# Patient Record
Sex: MALE | Race: WHITE | NOT HISPANIC OR LATINO | URBAN - METROPOLITAN AREA
[De-identification: names, ages, dates, MRNs, and addresses within clinical notes are randomized per-mention and may not be internally consistent; named-entity substitution may affect disease eponyms.]

---

## 2023-09-10 DIAGNOSIS — I10 HYPERTENSION, UNSPECIFIED TYPE: Primary | ICD-10-CM

## 2023-09-11 RX ORDER — METOPROLOL SUCCINATE 25 MG/1
25 TABLET, EXTENDED RELEASE ORAL DAILY
Qty: 30 TABLET | Refills: 0 | Status: SHIPPED | OUTPATIENT
Start: 2023-09-11 | End: 2023-10-17

## 2023-09-11 RX ORDER — AMLODIPINE BESYLATE 5 MG/1
5 TABLET ORAL DAILY
Qty: 30 TABLET | Refills: 0 | Status: SHIPPED | OUTPATIENT
Start: 2023-09-11 | End: 2023-10-17

## 2023-10-16 DIAGNOSIS — I10 HYPERTENSION, UNSPECIFIED TYPE: ICD-10-CM

## 2023-10-17 RX ORDER — AMLODIPINE BESYLATE 5 MG/1
TABLET ORAL
Qty: 30 TABLET | Refills: 0 | Status: SHIPPED | OUTPATIENT
Start: 2023-10-17 | End: 2023-11-15

## 2023-10-17 RX ORDER — METOPROLOL SUCCINATE 25 MG/1
TABLET, EXTENDED RELEASE ORAL
Qty: 30 TABLET | Refills: 0 | Status: SHIPPED | OUTPATIENT
Start: 2023-10-17 | End: 2023-11-15

## 2023-10-25 ENCOUNTER — TELEMEDICINE (OUTPATIENT)
Dept: PRIMARY CARE | Facility: CLINIC | Age: 48
End: 2023-10-25
Payer: COMMERCIAL

## 2023-10-25 DIAGNOSIS — B96.89 BACTERIAL SINUSITIS: Primary | ICD-10-CM

## 2023-10-25 DIAGNOSIS — J32.9 BACTERIAL SINUSITIS: Primary | ICD-10-CM

## 2023-10-25 DIAGNOSIS — U07.1 COVID: ICD-10-CM

## 2023-10-25 DIAGNOSIS — Z00.00 HEALTHCARE MAINTENANCE: ICD-10-CM

## 2023-10-25 PROCEDURE — 99213 OFFICE O/P EST LOW 20 MIN: CPT | Performed by: INTERNAL MEDICINE

## 2023-10-25 RX ORDER — AZITHROMYCIN 250 MG/1
TABLET, FILM COATED ORAL
Qty: 6 TABLET | Refills: 2 | Status: SHIPPED | OUTPATIENT
Start: 2023-10-25 | End: 2023-10-30

## 2023-10-25 ASSESSMENT — ENCOUNTER SYMPTOMS
PND: 0
SORE THROAT: 1
NECK PAIN: 0
HYPERTENSION: 1
SHORTNESS OF BREATH: 0
SWEATS: 0
ORTHOPNEA: 0
HEADACHES: 0
BLURRED VISION: 0
PALPITATIONS: 0

## 2023-10-25 NOTE — PROGRESS NOTES
Subjective   Patient ID: Grant Crews is a 48 y.o. male who presents for virtual visit and Sore Throat (Sore throat and congestion x 2 days. Had temperature of 99.0 yesterday.).  Sore Throat   Pertinent negatives include no headaches, neck pain or shortness of breath.   Hypertension  This is a chronic problem. The current episode started more than 1 year ago. The problem is unchanged. The problem is controlled. Pertinent negatives include no anxiety, blurred vision, chest pain, headaches, malaise/fatigue, neck pain, orthopnea, palpitations, peripheral edema, PND, shortness of breath or sweats. There are no associated agents to hypertension. Risk factors for coronary artery disease include family history and obesity. Compliance problems include exercise.                    Health Maintenance:      Colon man with past medical history of essential hypertension and GRACE, covid aug 22  He presented to the ER 10/04/2022 for lightheadedness that started while he was sitting at his desk at work. He was evaluated and results showed negative troponin x2, unremarkable CXR/EKG, and urinalysis and CBC/CMP WNL. He was discharged after symptomatically improving with instructions to hydrate, follow up with PCP, and with a prescription for pantoprazole for acid reflux.     Presents today with sinus congestion for the last 2 days grade 6 out of 10 worse since being diagnosed with COVID when he took a test maybe a little bit better with over-the-counter Coricidin  Odynophagia  Had a temperature yesterday of 99 degrees thought a little feverish  Denies chest pain dyspnea nausea vomiting fever chills otalgia otorrhea    oscopy:      Mammogram:      Pelvic/Pap:      Low dose chest CT:      Aorta duplex:      Optho:      Podiatry:        Vaccines:      Prevnar 20:      Prevnar 13:      Pneumovax 23:      Tdap:      Shingrix:      COVID:      Influenza:        ROS:      General: denies fever/chills/weight loss      Head: Sinus congestion  "denies HA/trauma/masses/dizziness      Eyes: denies vision change/loss of vision/blurry vision/diplopia/eye pain      Ears: denies hearing loss/tinnitus/otalgia/otorrhea      Nose: denies nasal drainage/anosmia      Throat: Odynophagia denies dysphagia/odynophagia      Lymphatics: denies lymph node swelling      Cardiac: denies CP/palpitations/orthopnea/PND      Pulmonary: denies dyspnea/cough/wheezing      GI: denies abd pain/n/v/diarrhea/melena/hematochezia/hematemesis      : denies dysuria/hematuria/change frequency      Genital: denies genital discharge/lesions      Skin: denies rashes/lesions/masses      MSK: denies weakness/swelling/edema/gait imbalance/pain      Neuro: denies paresthesias/seizures/dysarthria      Psych: denies depression/anxiety/suicidal or homicidal ideations            Objective   Ht 1.727 m (5' 8\")   BMI 30.56 kg/m²      Physical Exam:     General: AO3, NAD     Speaking in full sentences nonlabored  Appropriate mood and affect                  No results found for: \"BMPR1A\", \"CBCDIF\"      Assessment/Plan   Diagnoses and all orders for this visit:  Bacterial sinusitis  -     azithromycin (Zithromax) 250 mg tablet; Take 2 tablets (500 mg) by mouth once daily for 1 day, THEN 1 tablet (250 mg) once daily for 4 days. Take 2 tabs (500 mg) by mouth today, than 1 daily for 4 days..  COVID  -     azithromycin (Zithromax) 250 mg tablet; Take 2 tablets (500 mg) by mouth once daily for 1 day, THEN 1 tablet (250 mg) once daily for 4 days. Take 2 tabs (500 mg) by mouth today, than 1 daily for 4 days..  Healthcare maintenance  -     CBC and Auto Differential; Future  -     Comprehensive Metabolic Panel; Future  -     Hemoglobin A1C; Future  -     Lipid Panel; Future  -     Thyroxine, Free; Future  -     Thyroid Stimulating Hormone; Future    Go to the ER for chest pain shortness of breath or other concerning symptoms    Plenty clear fluids rest    Thank you for making appointment today Grant    Please " follow-up 6 months       Malia Lowry MA

## 2023-11-12 DIAGNOSIS — I10 HYPERTENSION, UNSPECIFIED TYPE: ICD-10-CM

## 2023-11-15 ENCOUNTER — LAB (OUTPATIENT)
Dept: LAB | Facility: LAB | Age: 48
End: 2023-11-15
Payer: COMMERCIAL

## 2023-11-15 DIAGNOSIS — Z00.00 HEALTHCARE MAINTENANCE: ICD-10-CM

## 2023-11-15 LAB
ALBUMIN SERPL BCP-MCNC: 4.6 G/DL (ref 3.4–5)
ALP SERPL-CCNC: 58 U/L (ref 33–120)
ALT SERPL W P-5'-P-CCNC: 35 U/L (ref 10–52)
ANION GAP SERPL CALC-SCNC: 11 MMOL/L (ref 10–20)
AST SERPL W P-5'-P-CCNC: 20 U/L (ref 9–39)
BASOPHILS # BLD AUTO: 0.03 X10*3/UL (ref 0–0.1)
BASOPHILS NFR BLD AUTO: 0.4 %
BILIRUB SERPL-MCNC: 0.7 MG/DL (ref 0–1.2)
BUN SERPL-MCNC: 20 MG/DL (ref 6–23)
CALCIUM SERPL-MCNC: 9.5 MG/DL (ref 8.6–10.6)
CHLORIDE SERPL-SCNC: 102 MMOL/L (ref 98–107)
CHOLEST SERPL-MCNC: 228 MG/DL (ref 0–199)
CHOLESTEROL/HDL RATIO: 5.4
CO2 SERPL-SCNC: 32 MMOL/L (ref 21–32)
CREAT SERPL-MCNC: 1.14 MG/DL (ref 0.5–1.3)
EOSINOPHIL # BLD AUTO: 0.14 X10*3/UL (ref 0–0.7)
EOSINOPHIL NFR BLD AUTO: 1.9 %
ERYTHROCYTE [DISTWIDTH] IN BLOOD BY AUTOMATED COUNT: 13.2 % (ref 11.5–14.5)
EST. AVERAGE GLUCOSE BLD GHB EST-MCNC: 111 MG/DL
GFR SERPL CREATININE-BSD FRML MDRD: 80 ML/MIN/1.73M*2
GLUCOSE SERPL-MCNC: 82 MG/DL (ref 74–99)
HBA1C MFR BLD: 5.5 %
HCT VFR BLD AUTO: 44.7 % (ref 41–52)
HDLC SERPL-MCNC: 42.1 MG/DL
HGB BLD-MCNC: 14.8 G/DL (ref 13.5–17.5)
IMM GRANULOCYTES # BLD AUTO: 0.03 X10*3/UL (ref 0–0.7)
IMM GRANULOCYTES NFR BLD AUTO: 0.4 % (ref 0–0.9)
LDLC SERPL CALC-MCNC: 157 MG/DL
LYMPHOCYTES # BLD AUTO: 2.46 X10*3/UL (ref 1.2–4.8)
LYMPHOCYTES NFR BLD AUTO: 34 %
MCH RBC QN AUTO: 29 PG (ref 26–34)
MCHC RBC AUTO-ENTMCNC: 33.1 G/DL (ref 32–36)
MCV RBC AUTO: 88 FL (ref 80–100)
MONOCYTES # BLD AUTO: 0.77 X10*3/UL (ref 0.1–1)
MONOCYTES NFR BLD AUTO: 10.7 %
NEUTROPHILS # BLD AUTO: 3.8 X10*3/UL (ref 1.2–7.7)
NEUTROPHILS NFR BLD AUTO: 52.6 %
NON HDL CHOLESTEROL: 186 MG/DL (ref 0–149)
NRBC BLD-RTO: 0 /100 WBCS (ref 0–0)
PLATELET # BLD AUTO: 286 X10*3/UL (ref 150–450)
POTASSIUM SERPL-SCNC: 4.5 MMOL/L (ref 3.5–5.3)
PROT SERPL-MCNC: 7 G/DL (ref 6.4–8.2)
RBC # BLD AUTO: 5.11 X10*6/UL (ref 4.5–5.9)
SODIUM SERPL-SCNC: 140 MMOL/L (ref 136–145)
T4 FREE SERPL-MCNC: 1.1 NG/DL (ref 0.78–1.48)
TRIGL SERPL-MCNC: 145 MG/DL (ref 0–149)
TSH SERPL-ACNC: 1.81 MIU/L (ref 0.44–3.98)
VLDL: 29 MG/DL (ref 0–40)
WBC # BLD AUTO: 7.2 X10*3/UL (ref 4.4–11.3)

## 2023-11-15 PROCEDURE — 84443 ASSAY THYROID STIM HORMONE: CPT

## 2023-11-15 PROCEDURE — 85025 COMPLETE CBC W/AUTO DIFF WBC: CPT

## 2023-11-15 PROCEDURE — 80061 LIPID PANEL: CPT

## 2023-11-15 PROCEDURE — 36415 COLL VENOUS BLD VENIPUNCTURE: CPT

## 2023-11-15 PROCEDURE — 83036 HEMOGLOBIN GLYCOSYLATED A1C: CPT

## 2023-11-15 PROCEDURE — 84439 ASSAY OF FREE THYROXINE: CPT

## 2023-11-15 PROCEDURE — 80053 COMPREHEN METABOLIC PANEL: CPT

## 2023-11-15 RX ORDER — AMLODIPINE BESYLATE 5 MG/1
TABLET ORAL
Qty: 90 TABLET | Refills: 0 | Status: SHIPPED | OUTPATIENT
Start: 2023-11-15 | End: 2024-02-14

## 2023-11-15 RX ORDER — METOPROLOL SUCCINATE 25 MG/1
TABLET, EXTENDED RELEASE ORAL
Qty: 90 TABLET | Refills: 0 | Status: SHIPPED | OUTPATIENT
Start: 2023-11-15 | End: 2024-02-14

## 2023-11-21 DIAGNOSIS — I10 HYPERTENSION, UNSPECIFIED TYPE: ICD-10-CM

## 2023-11-21 RX ORDER — METOPROLOL SUCCINATE 25 MG/1
TABLET, EXTENDED RELEASE ORAL
Qty: 90 TABLET | Refills: 0 | OUTPATIENT
Start: 2023-11-21

## 2023-11-21 RX ORDER — AMLODIPINE BESYLATE 5 MG/1
TABLET ORAL
Qty: 90 TABLET | Refills: 0 | OUTPATIENT
Start: 2023-11-21

## 2023-11-30 DIAGNOSIS — E78.5 HYPERLIPIDEMIA, UNSPECIFIED HYPERLIPIDEMIA TYPE: Primary | ICD-10-CM

## 2023-11-30 RX ORDER — ATORVASTATIN CALCIUM 10 MG/1
10 TABLET, FILM COATED ORAL DAILY
Qty: 90 TABLET | Refills: 1 | Status: SHIPPED | OUTPATIENT
Start: 2023-11-30 | End: 2024-04-04 | Stop reason: SDUPTHER

## 2024-02-13 DIAGNOSIS — I10 HYPERTENSION, UNSPECIFIED TYPE: ICD-10-CM

## 2024-02-14 RX ORDER — METOPROLOL SUCCINATE 25 MG/1
TABLET, EXTENDED RELEASE ORAL
Qty: 30 TABLET | Refills: 0 | Status: SHIPPED | OUTPATIENT
Start: 2024-02-14 | End: 2024-03-18

## 2024-02-14 RX ORDER — AMLODIPINE BESYLATE 5 MG/1
TABLET ORAL
Qty: 30 TABLET | Refills: 0 | Status: SHIPPED | OUTPATIENT
Start: 2024-02-14 | End: 2024-04-04 | Stop reason: SDUPTHER

## 2024-03-16 DIAGNOSIS — I10 HYPERTENSION, UNSPECIFIED TYPE: ICD-10-CM

## 2024-03-18 RX ORDER — METOPROLOL SUCCINATE 25 MG/1
TABLET, EXTENDED RELEASE ORAL
Qty: 30 TABLET | Refills: 0 | Status: SHIPPED | OUTPATIENT
Start: 2024-03-18 | End: 2024-04-04 | Stop reason: SDUPTHER

## 2024-03-19 ENCOUNTER — TELEPHONE (OUTPATIENT)
Dept: PRIMARY CARE | Facility: CLINIC | Age: 49
End: 2024-03-19
Payer: COMMERCIAL

## 2024-03-19 NOTE — TELEPHONE ENCOUNTER
L/M for patient that he is due for a follow up appointment in the office. There was a 30 day supply of his Metoprolol sent to his local pharmacy.

## 2024-03-27 ENCOUNTER — TELEPHONE (OUTPATIENT)
Dept: PRIMARY CARE | Facility: CLINIC | Age: 49
End: 2024-03-27
Payer: COMMERCIAL

## 2024-03-27 DIAGNOSIS — Z00.00 HEALTHCARE MAINTENANCE: Primary | ICD-10-CM

## 2024-04-02 ENCOUNTER — LAB (OUTPATIENT)
Dept: LAB | Facility: LAB | Age: 49
End: 2024-04-02
Payer: COMMERCIAL

## 2024-04-02 DIAGNOSIS — Z00.00 HEALTHCARE MAINTENANCE: ICD-10-CM

## 2024-04-02 LAB
ALBUMIN SERPL BCP-MCNC: 4.3 G/DL (ref 3.4–5)
ALP SERPL-CCNC: 60 U/L (ref 33–120)
ALT SERPL W P-5'-P-CCNC: 44 U/L (ref 10–52)
ANION GAP SERPL CALC-SCNC: 13 MMOL/L (ref 10–20)
AST SERPL W P-5'-P-CCNC: 25 U/L (ref 9–39)
BASOPHILS # BLD AUTO: 0.03 X10*3/UL (ref 0–0.1)
BASOPHILS NFR BLD AUTO: 0.5 %
BILIRUB SERPL-MCNC: 0.7 MG/DL (ref 0–1.2)
BUN SERPL-MCNC: 17 MG/DL (ref 6–23)
CALCIUM SERPL-MCNC: 9.5 MG/DL (ref 8.6–10.6)
CHLORIDE SERPL-SCNC: 104 MMOL/L (ref 98–107)
CHOLEST SERPL-MCNC: 132 MG/DL (ref 0–199)
CHOLESTEROL/HDL RATIO: 3.7
CO2 SERPL-SCNC: 29 MMOL/L (ref 21–32)
CREAT SERPL-MCNC: 1.28 MG/DL (ref 0.5–1.3)
EGFRCR SERPLBLD CKD-EPI 2021: 69 ML/MIN/1.73M*2
EOSINOPHIL # BLD AUTO: 0.14 X10*3/UL (ref 0–0.7)
EOSINOPHIL NFR BLD AUTO: 2.5 %
ERYTHROCYTE [DISTWIDTH] IN BLOOD BY AUTOMATED COUNT: 12.9 % (ref 11.5–14.5)
EST. AVERAGE GLUCOSE BLD GHB EST-MCNC: 105 MG/DL
GLUCOSE SERPL-MCNC: 114 MG/DL (ref 74–99)
HBA1C MFR BLD: 5.3 %
HCT VFR BLD AUTO: 45 % (ref 41–52)
HDLC SERPL-MCNC: 36.1 MG/DL
HGB BLD-MCNC: 14.8 G/DL (ref 13.5–17.5)
IMM GRANULOCYTES # BLD AUTO: 0.01 X10*3/UL (ref 0–0.7)
IMM GRANULOCYTES NFR BLD AUTO: 0.2 % (ref 0–0.9)
LDLC SERPL CALC-MCNC: 75 MG/DL
LYMPHOCYTES # BLD AUTO: 1.89 X10*3/UL (ref 1.2–4.8)
LYMPHOCYTES NFR BLD AUTO: 33.7 %
MCH RBC QN AUTO: 28.4 PG (ref 26–34)
MCHC RBC AUTO-ENTMCNC: 32.9 G/DL (ref 32–36)
MCV RBC AUTO: 86 FL (ref 80–100)
MONOCYTES # BLD AUTO: 0.67 X10*3/UL (ref 0.1–1)
MONOCYTES NFR BLD AUTO: 11.9 %
NEUTROPHILS # BLD AUTO: 2.87 X10*3/UL (ref 1.2–7.7)
NEUTROPHILS NFR BLD AUTO: 51.2 %
NON HDL CHOLESTEROL: 96 MG/DL (ref 0–149)
NRBC BLD-RTO: 0 /100 WBCS (ref 0–0)
PLATELET # BLD AUTO: 253 X10*3/UL (ref 150–450)
POTASSIUM SERPL-SCNC: 4.5 MMOL/L (ref 3.5–5.3)
PROT SERPL-MCNC: 6.4 G/DL (ref 6.4–8.2)
RBC # BLD AUTO: 5.21 X10*6/UL (ref 4.5–5.9)
SODIUM SERPL-SCNC: 141 MMOL/L (ref 136–145)
T4 FREE SERPL-MCNC: 1.02 NG/DL (ref 0.78–1.48)
TRIGL SERPL-MCNC: 103 MG/DL (ref 0–149)
TSH SERPL-ACNC: 1.48 MIU/L (ref 0.44–3.98)
VLDL: 21 MG/DL (ref 0–40)
WBC # BLD AUTO: 5.6 X10*3/UL (ref 4.4–11.3)

## 2024-04-02 PROCEDURE — 84443 ASSAY THYROID STIM HORMONE: CPT

## 2024-04-02 PROCEDURE — 36415 COLL VENOUS BLD VENIPUNCTURE: CPT

## 2024-04-02 PROCEDURE — 83036 HEMOGLOBIN GLYCOSYLATED A1C: CPT

## 2024-04-02 PROCEDURE — 80053 COMPREHEN METABOLIC PANEL: CPT

## 2024-04-02 PROCEDURE — 84439 ASSAY OF FREE THYROXINE: CPT

## 2024-04-02 PROCEDURE — 85025 COMPLETE CBC W/AUTO DIFF WBC: CPT

## 2024-04-02 PROCEDURE — 80061 LIPID PANEL: CPT

## 2024-04-03 PROBLEM — L30.9 ECZEMA: Status: ACTIVE | Noted: 2024-04-03

## 2024-04-03 PROBLEM — E78.5 HYPERLIPIDEMIA: Status: ACTIVE | Noted: 2024-04-03

## 2024-04-03 PROBLEM — L20.9 ATOPIC DERMATITIS: Status: ACTIVE | Noted: 2024-04-03

## 2024-04-03 PROBLEM — J32.9 BACTERIAL SINUSITIS: Status: RESOLVED | Noted: 2024-04-03 | Resolved: 2024-04-03

## 2024-04-03 PROBLEM — I10 BENIGN ESSENTIAL HYPERTENSION: Status: ACTIVE | Noted: 2022-10-04

## 2024-04-03 PROBLEM — R79.89 HIGH SERUM CREATININE: Status: ACTIVE | Noted: 2024-04-03

## 2024-04-03 PROBLEM — M62.830 PARASPINAL MUSCLE SPASM: Status: RESOLVED | Noted: 2024-04-03 | Resolved: 2024-04-03

## 2024-04-03 PROBLEM — R06.83 SNORING: Status: RESOLVED | Noted: 2024-04-03 | Resolved: 2024-04-03

## 2024-04-03 PROBLEM — R69 DISEASE SUSPECTED: Status: RESOLVED | Noted: 2024-04-03 | Resolved: 2024-04-03

## 2024-04-03 PROBLEM — B96.89 BACTERIAL SINUSITIS: Status: RESOLVED | Noted: 2024-04-03 | Resolved: 2024-04-03

## 2024-04-03 PROBLEM — L71.8 OTHER ROSACEA: Status: RESOLVED | Noted: 2021-10-20 | Resolved: 2024-04-03

## 2024-04-03 PROBLEM — R40.0 DAYTIME SOMNOLENCE: Status: ACTIVE | Noted: 2024-04-03

## 2024-04-03 PROBLEM — U07.1 DISEASE DUE TO SEVERE ACUTE RESPIRATORY SYNDROME CORONAVIRUS 2 (SARS-COV-2): Status: RESOLVED | Noted: 2024-04-03 | Resolved: 2024-04-03

## 2024-04-03 PROBLEM — G47.33 OBSTRUCTIVE SLEEP APNEA SYNDROME: Status: ACTIVE | Noted: 2022-10-04

## 2024-04-04 ENCOUNTER — OFFICE VISIT (OUTPATIENT)
Dept: PRIMARY CARE | Facility: CLINIC | Age: 49
End: 2024-04-04
Payer: COMMERCIAL

## 2024-04-04 VITALS
DIASTOLIC BLOOD PRESSURE: 78 MMHG | HEIGHT: 68 IN | SYSTOLIC BLOOD PRESSURE: 122 MMHG | WEIGHT: 209 LBS | BODY MASS INDEX: 31.67 KG/M2 | OXYGEN SATURATION: 98 % | HEART RATE: 74 BPM

## 2024-04-04 DIAGNOSIS — E78.5 HYPERLIPIDEMIA, UNSPECIFIED HYPERLIPIDEMIA TYPE: ICD-10-CM

## 2024-04-04 DIAGNOSIS — E66.9 OBESITY, UNSPECIFIED CLASSIFICATION, UNSPECIFIED OBESITY TYPE, UNSPECIFIED WHETHER SERIOUS COMORBIDITY PRESENT: ICD-10-CM

## 2024-04-04 DIAGNOSIS — I10 HYPERTENSION, UNSPECIFIED TYPE: ICD-10-CM

## 2024-04-04 DIAGNOSIS — Z00.00 HEALTHCARE MAINTENANCE: Primary | ICD-10-CM

## 2024-04-04 PROCEDURE — 3074F SYST BP LT 130 MM HG: CPT | Performed by: INTERNAL MEDICINE

## 2024-04-04 PROCEDURE — 99213 OFFICE O/P EST LOW 20 MIN: CPT | Performed by: INTERNAL MEDICINE

## 2024-04-04 PROCEDURE — 3078F DIAST BP <80 MM HG: CPT | Performed by: INTERNAL MEDICINE

## 2024-04-04 PROCEDURE — 1036F TOBACCO NON-USER: CPT | Performed by: INTERNAL MEDICINE

## 2024-04-04 RX ORDER — AMLODIPINE BESYLATE 5 MG/1
TABLET ORAL
Qty: 90 TABLET | Refills: 1 | Status: SHIPPED | OUTPATIENT
Start: 2024-04-04

## 2024-04-04 RX ORDER — ATORVASTATIN CALCIUM 10 MG/1
10 TABLET, FILM COATED ORAL DAILY
Qty: 90 TABLET | Refills: 1 | Status: SHIPPED | OUTPATIENT
Start: 2024-04-04

## 2024-04-04 RX ORDER — METOPROLOL SUCCINATE 25 MG/1
TABLET, EXTENDED RELEASE ORAL
Qty: 90 TABLET | Refills: 1 | Status: SHIPPED | OUTPATIENT
Start: 2024-04-04

## 2024-04-04 ASSESSMENT — PATIENT HEALTH QUESTIONNAIRE - PHQ9
1. LITTLE INTEREST OR PLEASURE IN DOING THINGS: NOT AT ALL
2. FEELING DOWN, DEPRESSED OR HOPELESS: NOT AT ALL
SUM OF ALL RESPONSES TO PHQ9 QUESTIONS 1 AND 2: 0

## 2024-04-04 NOTE — PROGRESS NOTES
Subjective   Patient ID: Grant Crews is a 48 y.o. male who presents for Follow-up and Med Refill.  HPI  man with past medical history of essential hypertension and GRACE, covid aug 22  He presented to the ER 10/04/2022 for lightheadedness that started while he was sitting at his desk at work. He was evaluated and results showed negative troponin x2, unremarkable CXR/EKG, and urinalysis and CBC/CMP WNL. He was discharged after symptomatically improving with instructions to hydrate, follow up with PCP, and with a prescription for pantoprazole for acid reflux.     Patient overall doing very well only concern seems to be he is made a lot of dietary changes eating smaller portions she uses a male meal service exercising walking 3 times a week 3 miles at a time and has not lost weight which she like to              Health Maintenance:      Colonoscopy:      Mammogram:      Pelvic/Pap:      Low dose chest CT:      Aorta duplex:      Optho:      Podiatry:        Vaccines:      Refer to Epic Vaccination Log        ROS:      General: Inability to lose more weight denies fever/chills/weight loss      Head: denies HA/trauma/masses/dizziness      Eyes: denies vision change/loss of vision/blurry vision/diplopia/eye pain      Ears: denies hearing loss/tinnitus/otalgia/otorrhea      Nose: denies nasal drainage/anosmia      Throat: denies dysphagia/odynophagia      Lymphatics: denies lymph node swelling      Breast: denies masses/discharge/dimpling/skin changes      Cardiac: denies CP/palpitations/orthopnea/PND      Pulmonary: denies dyspnea/cough/wheezing      GI: denies abd pain/n/v/diarrhea/melena/hematochezia/hematemesis      : denies dysuria/hematuria/change frequency      Genital: denies genital discharge/lesions      Skin: denies rashes/lesions/masses      MSK: denies weakness/swelling/edema/gait imbalance/pain      Neuro: denies paresthesias/seizures/dysarthria      Psych: denies depression/anxiety/suicidal or homicidal  "ideations            Objective   /78   Pulse 74   Ht 1.727 m (5' 8\")   Wt 94.8 kg (209 lb)   SpO2 98%   BMI 31.78 kg/m²      Physical Exam:     General: AO3, NAD     Head: atraumatic/NC     Eyes: EOMI/PERRLA. Negative APD     Ears: TM pearly gray, EAC clear. No lesions or erythema     Nose: symmetric nares, no discharge     Throat: trachea midline, uvula midline pink mucosa. No thyromegaly     Lymphatics: no cervical/supraclavicular/ant or posterior cervical adenopathy/axillary/inguinal adenopathy     Breast: not examined     Chest: no deformity or tenderness to palpation     Pulm: CTA b/l, no wheeze/rhonchi/rales. nonlabored     Cardiac: RRR +s1s2, no m/r/g.      GI: soft, NT/ND. Normoactive Bsx4. No rebound/guarding.     Rectal: not examined     Genital: not examined     MSK: 5/5 strength UE LE. No edema/clubbing/cyanosis     Skin: no rashes/lesions     Vascular: 2+ palp DP PT radials b/l. Negative carotid bruit     Neuro: CNII-XII intact. No focal deficits. Reflexes 2/4 brachioradialis bicep tricep patellar achilles. Finger to nose intact.     Psych: appropriate mood/affect                    No results found for: \"BMPR1A\", \"CBCDIF\"      Assessment/Plan   Diagnoses and all orders for this visit:  Healthcare maintenance  -     Testosterone, total and free; Future  -     Cortisol; Future  Hypertension, unspecified type  -     metoprolol succinate XL (Toprol-XL) 25 mg 24 hr tablet; TAKE ONE TABLET BY MOUTH ONCE DAILY.  -     amLODIPine (Norvasc) 5 mg tablet; TAKE ONE TABLET BY MOUTH once DAILY.  Hyperlipidemia, unspecified hyperlipidemia type  -     atorvastatin (Lipitor) 10 mg tablet; Take 1 tablet (10 mg) by mouth once daily.  Obesity, unspecified classification, unspecified obesity type, unspecified whether serious comorbidity present  -     Referral to Nutrition Services; Future       Continue the exercise regimen you are on include more variety some other types of exercises recommended such as " running    Screening blood work due April 2025    Thank you for making appointment today Grant    Please follow-up 6 months    Hu Albarran DO, KILEY Lowry MA

## 2024-05-15 ENCOUNTER — TELEMEDICINE CLINICAL SUPPORT (OUTPATIENT)
Dept: NUTRITION | Facility: CLINIC | Age: 49
End: 2024-05-15
Payer: COMMERCIAL

## 2024-05-15 VITALS — WEIGHT: 209 LBS | BODY MASS INDEX: 31.67 KG/M2 | HEIGHT: 68 IN

## 2024-05-15 DIAGNOSIS — E66.9 OBESITY, UNSPECIFIED CLASSIFICATION, UNSPECIFIED OBESITY TYPE, UNSPECIFIED WHETHER SERIOUS COMORBIDITY PRESENT: ICD-10-CM

## 2024-05-15 PROCEDURE — 97802 MEDICAL NUTRITION INDIV IN: CPT | Performed by: DIETITIAN, REGISTERED

## 2024-05-15 PROCEDURE — 97802 MEDICAL NUTRITION INDIV IN: CPT | Mod: 95 | Performed by: DIETITIAN, REGISTERED

## 2024-05-15 NOTE — PROGRESS NOTES
"Reason for Nutrition Visit:  Pt is a 48 y.o. male being seen remotely. Pt was referred by DO karen Kearney May 15, 2024.   1. Obesity, unspecified classification, unspecified obesity type, unspecified whether serious comorbidity present  Referral to Nutrition Services           Past Medical Hx:  Patient Active Problem List   Diagnosis    Benign essential hypertension    Atopic dermatitis    Eczema    High serum creatinine    Daytime somnolence    Hyperlipidemia    Obstructive sleep apnea syndrome        Current Outpatient Medications:     amLODIPine (Norvasc) 5 mg tablet, TAKE ONE TABLET BY MOUTH once DAILY., Disp: 90 tablet, Rfl: 1    atorvastatin (Lipitor) 10 mg tablet, Take 1 tablet (10 mg) by mouth once daily., Disp: 90 tablet, Rfl: 1    fish oil concentrate (Omega-3) 120-180 mg capsule, Take 1 capsule (1 g) by mouth once daily., Disp: , Rfl:     metoprolol succinate XL (Toprol-XL) 25 mg 24 hr tablet, TAKE ONE TABLET BY MOUTH ONCE DAILY., Disp: 90 tablet, Rfl: 1     Anthropometrics:  Anthropometrics  Height: 172.7 cm (5' 7.99\")  Weight: 94.8 kg (209 lb)  BMI (Calculated): 31.79   Weight change:    Significant Weight Change: No    Lab Results   Component Value Date    HGBA1C 5.3 04/02/2024    CHOL 132 04/02/2024    LDLF 134 (H) 08/23/2022    TRIG 103 04/02/2024    HDL 36.1 04/02/2024        Chemistry    Lab Results   Component Value Date/Time     04/02/2024 0840    K 4.5 04/02/2024 0840     04/02/2024 0840    CO2 29 04/02/2024 0840    BUN 17 04/02/2024 0840    CREATININE 1.28 04/02/2024 0840    Lab Results   Component Value Date/Time    CALCIUM 9.5 04/02/2024 0840    ALKPHOS 60 04/02/2024 0840    AST 25 04/02/2024 0840    ALT 44 04/02/2024 0840    BILITOT 0.7 04/02/2024 0840         Food and Nutrition Hx:  Pt wakes up feeling okay. He feels energy throughout the day.    He does not feel hunger upon waking.   Pt wakes up at 6:30 am.    24 Diet Recall:  Meal 1: Breakfast is at 10:00 am to " "include 0.5 cup of quick oatmeal with water and sometimes brown sugar or trista bar (kcal 150- 250) .   Meal 2: Lunch is at 12:30- 1:30 to include a lunch meat sandwich, 1 handful of chips and yogurt (kcal 530), or leftovers from dinner the night before or chicken wrap.   Pt states he does not have a shack but he becomes irritable (this could be hunger).   Meal 3: Dinner is at 6:00 pm. He just signed up for [employer]al Delivery. Service. Dinner may be 2 cups of pasta with sausage and onions (kcal 800- 1000)  Snacks: no snacking   Beverages: water is consumed all day.   Calories are concentrated at dinner.     Allergies: Shellfish  Intolerance: None  Appetite: Good  Intake: >75%  GI Symptoms : None   Swallowing Difficulty: No problems with swallowing  Dentition : own    Types of Activities: Walking 30- 40 minutes three times a week and some push-up and sit-ups for 10- 15 minutes 2 -3 times a week. Exercise is at a moderate intensity exercise. Total exercise per week 141 minutes.     Sleep duration/quality : 7+ hours and continuous sleep  Sleep disorders: none    Supplements: Multivitamin and Fish Oil daily    Feelings of Hunger?: Doesn't notice  Physical Feeling: Does not feel fullness, Physically full, Stuffed, and Sluggish  Binging: Never  Cravings: None  Energy Levels: Stable    Food Preparation: Patient  Cooking Skills/Barriers: Limited cooking skills and Time constraints  Grocery Shopping: Patient    Eating Out Type: Restaurant and Take Out  1 times per week    Nutrition Focused Physical Exam:    Performed/Deferred: Deferred due to be being virtual visit    Estimated Energy Needs:  Energy Needs  Calculated Energy Needs Using Equations  Height: 172.7 cm (5' 7.99\")  Weight Used for Equation Calculations: 94.8 kg (209 lb)  [place] Saul Equation (Overweight or Obese Patients): 1797  Equation Chosen to Use by RD: Amandeep Louis  Activity Factor: 1.3  Total Energy Needs: 2330  Estimated Energy Needs  Total " Energy Estimated Needs (kCal): 1880 kCal  Estimated Protein Needs  Total Protein Estimated Needs (g): 75 g  Total Protein Estimated Needs (g/kg): 0.8 g/kg  Method for Estimating Needs: Actual wt was used to calculate needs.     Nutrition Diagnosis:  Nutrition Diagnosis    Patient has Nutrition DiagnosisYesDiagnosis Status (1)NewNutrition Diagnosis 1Food and nutrition related knowledge deficitRelated to (1)how to eat for wt loss with current BMI at 31As Evidenced by (1)reports by pt of the neeed to learn.Additional Nutrition DiagnosisDiagnosis 2Diagnosis Status (2)NewNutrition Diagnosis 2Excessive energy intakeRelated to (2)lack of meal and snack schedule with skipped meals and meals that could omit protien that can reduce metabolismAs Evidenced by (2)kcal intake exceeds calories recommended for wt loss.Diagnosis Status (3)NewNutrition Diagnosis 3Self monitoring deficitRelated to (3)limited ability to know when to eat and when to stop eatingAs Evidenced by (3)reports by pt of lack of hunger and satiety cues at times.    Nutrition Interventions:  Medical nutrition therapy was given for wt loss.   Nutrition Counseling: Motivational Interviewing and CBT  Coordination of Care: None    Nutrition Monitoring and Evaluation  Wt  Kcal  Hunger/satiety     Nutrition Recommendation   1,500- 1,900 calories per day for 1 -2 lb wt loss. Heart healthy meal plan with a low saturated fat intake to <5 -6 % of energy (less than 11 grams of saturated fat per day), reduced intake of added sugars (<10% of total energy), 38 grams of fiber with intake of viscous fiber to 5 g/day to 10 g/day, plant sterols/stanols to 2 g/day, and 1.6 gram of omega three fatty acids to reduce LDL cholesterol.    Nutrition Goals:   Via teach back method patient verbalized understanding of the following topics:  1) Eating three meals per day can increase the metabolism, this is called the thermal effect of eating. Eating three meals burns more calories than two  meals consumed per day. Strive to consume breakfast within 1 -2 hours of waking to jump start the metabolism. Aim for breakfast at 8:30, lunch at 12:30, snack at 4:00 pm, and dinner at 6:00 pm.   2) Strive to include protein at the meals and even snacks. Protein at meals can increase the metabolism too.  Protein at snacks can sustain energy, stabilize blood glucose, and provide more contentment. Protein foods include eggs, egg whites, cheese, nuts, nut butters, Greek yogurt, poultry, meat, fish, tofu, plant-based protein powder, and/or plant-based protein drinks. Strive to select a protein drink that contains ~20 grams of protein.   3) It is recommended to consume 1,500- 1,900 calories per day for 1 -2 lb wt loss. It is reccommended to spread calories throughout the day limiting calories at one meal to no more than 600 calories at one time.   4) The recommendation for exercise and health is to participate in 150 minutes of moderate exercise per week. Consider walking 5 days a week for 30 minutes or 4 days a week for 40 minutes.  Moderate exercise means that there is an increase in heart rate yet a conversation could still be participated in.     Educational Handouts: Metabolism and wt loss     Hellen Cheney, MS, RDN, LD, DHARMESH   Advanced Practice Clinical Dietitian  [email or web address]  Schedule line [phone]  Direct line [phone]     Readiness to Change : Good  Level of Understanding : Good  Anticipated Compliant : Good

## 2024-05-15 NOTE — PATIENT INSTRUCTIONS
1) Eating three meals per day can increase the metabolism, this is called the thermal effect of eating. Eating three meals burns more calories than two meals consumed per day. Strive to consume breakfast within 1 -2 hours of waking to jump start the metabolism. Aim for breakfast at 8:30, lunch at 12:30, snack at 4:00 pm, and dinner at 6:00 pm.   2) Strive to include protein at the meals and even snacks. Protein at meals can increase the metabolism too.  Protein at snacks can sustain energy, stabilize blood glucose, and provide more contentment. Protein foods include eggs, egg whites, cheese, nuts, nut butters, Greek yogurt, poultry, meat, fish, tofu, plant-based protein powder, and/or plant-based protein drinks. Strive to select a protein drink that contains ~20 grams of protein.   3) It is recommended to consume 1,500- 1,900 calories per day for 1 -2 lb wt loss. It is reccommended to spread calories throughout the day limiting calories at one meal to no more than 600 calories at one time.   4) The recommendation for exercise and health is to participate in 150 minutes of moderate exercise per week. Consider walking 5 days a week for 30 minutes or 4 days a week for 40 minutes.  Moderate exercise means that there is an increase in heart rate yet a conversation could still be participated in.     Educational Handouts: Metabolism and wt loss     Hellen Cheney, MS, RDN, LD, DHARMESH   Advanced Practice Clinical Dietitian  [email or web address]  Schedule line [phone]  Direct line [phone]

## 2024-07-03 ENCOUNTER — TELEMEDICINE CLINICAL SUPPORT (OUTPATIENT)
Dept: NUTRITION | Facility: CLINIC | Age: 49
End: 2024-07-03
Payer: COMMERCIAL

## 2024-07-03 DIAGNOSIS — E66.9 OBESITY, UNSPECIFIED CLASSIFICATION, UNSPECIFIED OBESITY TYPE, UNSPECIFIED WHETHER SERIOUS COMORBIDITY PRESENT: Primary | ICD-10-CM

## 2024-07-03 PROCEDURE — 97803 MED NUTRITION INDIV SUBSEQ: CPT | Performed by: DIETITIAN, REGISTERED

## 2024-07-03 NOTE — PROGRESS NOTES
Reason for Nutrition Visit:  Pt is a 48 y.o. male being seen remotely. Pt was referred by DO karen Kearney May 15, 2024.       1. Obesity, unspecified classification, unspecified obesity type, unspecified whether serious comorbidity present [E66.9]           Past Medical Hx:  Patient Active Problem List   Diagnosis    Benign essential hypertension    Atopic dermatitis    Eczema    High serum creatinine    Daytime somnolence    Hyperlipidemia    Obstructive sleep apnea syndrome        Current Outpatient Medications:     amLODIPine (Norvasc) 5 mg tablet, TAKE ONE TABLET BY MOUTH once DAILY., Disp: 90 tablet, Rfl: 1    atorvastatin (Lipitor) 10 mg tablet, Take 1 tablet (10 mg) by mouth once daily., Disp: 90 tablet, Rfl: 1    fish oil concentrate (Omega-3) 120-180 mg capsule, Take 1 capsule (1 g) by mouth once daily., Disp: , Rfl:     metoprolol succinate XL (Toprol-XL) 25 mg 24 hr tablet, TAKE ONE TABLET BY MOUTH ONCE DAILY., Disp: 90 tablet, Rfl: 1     Anthropometrics:      Weight change:    Significant Weight Change: No  05/2024 Weight: 209 lbs  07/2024 Weight: 195 lbs     Lab Results   Component Value Date    HGBA1C 5.3 04/02/2024    CHOL 132 04/02/2024    LDLF 134 (H) 08/23/2022    TRIG 103 04/02/2024    HDL 36.1 04/02/2024        Chemistry    Lab Results   Component Value Date/Time     04/02/2024 0840    K 4.5 04/02/2024 0840     04/02/2024 0840    CO2 29 04/02/2024 0840    BUN 17 04/02/2024 0840    CREATININE 1.28 04/02/2024 0840    Lab Results   Component Value Date/Time    CALCIUM 9.5 04/02/2024 0840    ALKPHOS 60 04/02/2024 0840    AST 25 04/02/2024 0840    ALT 44 04/02/2024 0840    BILITOT 0.7 04/02/2024 0840         Food and Nutrition Hx:  Pt wakes up feeling okay. He feels energy throughout the day.    He does not feel hunger upon waking.     Pt had a follow-up appt. He has been trying to add protein. He has been consuming 3 meals per day. He has been walking longer and more often. He  has been using iversitypal with kcal between 1500- 1800 calories per day.   Pt wakes up at 6:30 am.    24 Diet Recall:  Meal 1: Breakfast is at 10:00 am to include 0.5 cup of quick oatmeal with water and sometimes brown sugar or trista bar (kcal 150- 250) .   Meal 2: Lunch is at 12:30- 1:30 to include a lunch meat sandwich, 1 handful of chips and yogurt (kcal 530), or leftovers from dinner the night before or chicken wrap.   Pt states he does not have a shack but he becomes irritable (this could be hunger).   Meal 3: Dinner is at 6:00 pm. He just signed up for [employer] Meal Delivery. Service. Dinner may be 2 cups of pasta with sausage and onions (kcal 800- 1000)  Snacks: no snacking   Beverages: water is consumed all day.   Calories are concentrated at dinner.     Allergies: Shellfish  Intolerance: None  Appetite: Good  Intake: >75%  GI Symptoms : None   Swallowing Difficulty: No problems with swallowing  Dentition : own    Types of Activities: Walking 30- 40 minutes three times a week and some push-up and sit-ups for 10- 15 minutes 2 -3 times a week. Exercise is at a moderate intensity exercise. Total exercise per week 141 minutes.     Sleep duration/quality : 7+ hours and continuous sleep  Sleep disorders: none    Supplements: Multivitamin and Fish Oil daily    Feelings of Hunger?: Doesn't notice  Physical Feeling: Does not feel fullness, Physically full, Stuffed, and Sluggish  Binging: Never  Cravings: None  Energy Levels: Stable    Food Preparation: Patient  Cooking Skills/Barriers: Limited cooking skills and Time constraints  Grocery Shopping: Patient    Eating Out Type: Restaurant and Take Out  1 times per week    Nutrition Focused Physical Exam:    Performed/Deferred: Deferred due to be being virtual visit    Estimated Energy Needs:  Energy Needs        Nutrition Diagnosis:  Nutrition Diagnosis    Patient has Nutrition Diagnosis Yes  Diagnosis Status (1)ongoing   Nutrition Diagnosis 1Food and nutrition  "related knowledge deficitRelated to (1)how to eat for wt loss with current BMI at 31As Evidenced by (1)reports by pt of the neeed to learn.Additional   Nutrition DiagnosisDiagnosis 2  Diagnosis Status (2)ongoing  Nutrition Diagnosis 2Excessive energy intakeRelated to (2)lack of meal and snack schedule with skipped meals and meals that could omit protien that can reduce metabolismAs Evidenced by (2)kcal intake exceeds calories recommended for wt loss.  Diagnosis Status (3)Ongoing   Self monitoring deficitRelated to (3)limited ability to know when to eat and when to stop eatingAs Evidenced by (3)reports by pt of lack of hunger and satiety cues at times.    Nutrition Interventions:  Medical nutrition therapy was given for wt loss.   Nutrition Counseling: Motivational Interviewing and CBT  Coordination of Care: None    Nutrition Monitoring and Evaluation  Wt  Kcal  Hunger/satiety     Nutrition Recommendation   1,500- 1,900 calories per day for 1 -2 lb wt loss. Heart healthy meal plan with a low saturated fat intake to <5 -6 % of energy (less than 11 grams of saturated fat per day), reduced intake of added sugars (<10% of total energy), 38 grams of fiber with intake of viscous fiber to 5 g/day to 10 g/day, plant sterols/stanols to 2 g/day, and 1.6 gram of omega three fatty acids to reduce LDL cholesterol.    Nutrition Goals:   Via teach back method patient verbalized understanding of the following topics:  Strive to add an afternoon snack to help nourish the body in the afternoon. If energy drops in the afternoon, hunger may be felt and strive to respond to hunger with eating.   The plate method was recommended to help portion foods at meals and to structure. Using a 9\" plate, recommended for 1/2 of the plate to include non-starchy vegetable and suggested to consume this first.  Recommended protein to be included but limited to 3 ounces at meals. Recommended 1/4 of the plate or 1 cup of a grain or starch with a fruit, " milk or yogurt at meals. For more information on the plate method visit myplate.gov.   Below is a list of non-starchy vegetables. Strive to consume these vegetables at lunch and dinner.   .Artichokes                          Mushrooms  Asparagus                           Okra  Pickett sprouts                       Onions  Beets                                   Parsnips  Broccoli                              Peapods  Brussel Sprouts                   Peppers  Cabbage                              Radishes  Carrots                                Salad Greens:  Cauliflower                             Endive  Celery                                     Escarole  Cucumber                               Lettuce  Eggplant                                 Pilo  Garlic                                      Spinach   Green Onions                     Sauerkraut   Green Beans                       Summer Squash  Greens:                               Tomato     Meño                             Turnips     Kale                                  Vegetable Juice                           Mustard                            Water chestnuts    Turnip                               Wax beans  Leeks                                   Watercress  Mixed Vegetables               Zucchini     (without corn, etc).   4. The recommendation for exercise and wt loss is 300- 400 minutes per week. Aim for 60 to 90 minutes of exercise 5 days a week. Incorporate 60 minutes of cardiovascular activity with 30 minutes of resistance training. Incorporate yoga one a day a week. An exercise routine incorporates a 5 minutes of warm up and then stretching, cardiovascular exercise/resistance, 5 -10- minutes of cool down and then stretching.     Educational Handouts: Plate Method   Metabolism and wt loss     Hellen Cheney, MS, RDN, LD, DHARMESH   Advanced Practice Clinical Dietitian  [email or web address]  Schedule line [phone]  Direct line  [phone]     Readiness to Change : Good  Level of Understanding : Good  Anticipated Compliant : Good

## 2024-10-04 DIAGNOSIS — I10 HYPERTENSION, UNSPECIFIED TYPE: ICD-10-CM

## 2024-10-04 RX ORDER — AMLODIPINE BESYLATE 5 MG/1
TABLET ORAL
Qty: 30 TABLET | Refills: 0 | Status: SHIPPED | OUTPATIENT
Start: 2024-10-04

## 2024-10-14 DIAGNOSIS — I10 HYPERTENSION, UNSPECIFIED TYPE: ICD-10-CM

## 2024-10-14 RX ORDER — METOPROLOL SUCCINATE 25 MG/1
TABLET, EXTENDED RELEASE ORAL
Qty: 30 TABLET | Refills: 0 | Status: SHIPPED | OUTPATIENT
Start: 2024-10-14

## 2024-11-06 ENCOUNTER — APPOINTMENT (OUTPATIENT)
Dept: PRIMARY CARE | Facility: CLINIC | Age: 49
End: 2024-11-06
Payer: COMMERCIAL

## 2024-11-06 VITALS — SYSTOLIC BLOOD PRESSURE: 128 MMHG | DIASTOLIC BLOOD PRESSURE: 88 MMHG | WEIGHT: 205 LBS | BODY MASS INDEX: 31.18 KG/M2

## 2024-11-06 DIAGNOSIS — Z00.00 HEALTHCARE MAINTENANCE: Primary | ICD-10-CM

## 2024-11-06 DIAGNOSIS — I10 BENIGN ESSENTIAL HYPERTENSION: ICD-10-CM

## 2024-11-06 PROCEDURE — 3074F SYST BP LT 130 MM HG: CPT | Performed by: INTERNAL MEDICINE

## 2024-11-06 PROCEDURE — 99213 OFFICE O/P EST LOW 20 MIN: CPT | Performed by: INTERNAL MEDICINE

## 2024-11-06 PROCEDURE — 3079F DIAST BP 80-89 MM HG: CPT | Performed by: INTERNAL MEDICINE

## 2024-11-06 PROCEDURE — 1036F TOBACCO NON-USER: CPT | Performed by: INTERNAL MEDICINE

## 2024-11-06 ASSESSMENT — PATIENT HEALTH QUESTIONNAIRE - PHQ9
SUM OF ALL RESPONSES TO PHQ9 QUESTIONS 1 AND 2: 0
1. LITTLE INTEREST OR PLEASURE IN DOING THINGS: NOT AT ALL
2. FEELING DOWN, DEPRESSED OR HOPELESS: NOT AT ALL

## 2024-11-06 NOTE — PROGRESS NOTES
Subjective   Patient ID: Grant Crews is a 49 y.o. male who presents for Follow-up.  HPI  man with past medical history of essential hypertension and GRACE, covid aug 22  He presented to the ER 10/04/2022 for lightheadedness that started while he was sitting at his desk at work. He was evaluated and results showed negative troponin x2, unremarkable CXR/EKG, and urinalysis and CBC/CMP WNL. He was discharged after symptomatically improving with instructions to hydrate, follow up with PCP, and with a prescription for pantoprazole for acid reflux.     Patient continues to do well no issues here for medication refills              Health Maintenance:      Colonoscopy:      Mammogram:      Pelvic/Pap:      Low dose chest CT:      Aorta duplex:      Optho:      Podiatry:        Vaccines:      Refer to Epic Vaccination Log        ROS:      General: denies fever/chills/weight loss      Head: denies HA/trauma/masses/dizziness      Eyes: denies vision change/loss of vision/blurry vision/diplopia/eye pain      Ears: denies hearing loss/tinnitus/otalgia/otorrhea      Nose: denies nasal drainage/anosmia      Throat: denies dysphagia/odynophagia      Lymphatics: denies lymph node swelling      Breast: denies masses/discharge/dimpling/skin changes      Cardiac: denies CP/palpitations/orthopnea/PND      Pulmonary: denies dyspnea/cough/wheezing      GI: denies abd pain/n/v/diarrhea/melena/hematochezia/hematemesis      : denies dysuria/hematuria/change frequency      Genital: denies genital discharge/lesions      Skin: denies rashes/lesions/masses      MSK: denies weakness/swelling/edema/gait imbalance/pain      Neuro: denies paresthesias/seizures/dysarthria      Psych: denies depression/anxiety/suicidal or homicidal ideations            Objective   /88   Wt 93 kg (205 lb)   BMI 31.18 kg/m²      Physical Exam:     General: AO3, NAD     Head: atraumatic/NC     Eyes: EOMI/PERRLA. Negative APD     Ears: TM pearly gray, EAC clear. No  "lesions or erythema     Nose: symmetric nares, no discharge     Throat: trachea midline, uvula midline pink mucosa. No thyromegaly     Lymphatics: no cervical/supraclavicular/ant or posterior cervical adenopathy/axillary/inguinal adenopathy     Breast: not examined     Chest: no deformity or tenderness to palpation     Pulm: CTA b/l, no wheeze/rhonchi/rales. nonlabored     Cardiac: RRR +s1s2, no m/r/g.      GI: soft, NT/ND. Normoactive Bsx4. No rebound/guarding.     Rectal: not examined     Genital: not examined     MSK: 5/5 strength UE LE. No edema/clubbing/cyanosis     Skin: no rashes/lesions     Vascular: 2+ palp DP PT radials b/l. Negative carotid bruit     Neuro: CNII-XII intact. No focal deficits. Reflexes 2/4 brachioradialis bicep tricep patellar achilles. Finger to nose intact.     Psych: appropriate mood/affect                    No results found for: \"BMPR1A\", \"CBCDIF\"    Patient states he defers vaccines he actually felt like he was sick more often when he got the flu vaccine so he is deferring vaccine at this time  Assessment/Plan   Diagnoses and all orders for this visit:  Healthcare maintenance  -     CBC and Auto Differential; Future  -     Comprehensive Metabolic Panel; Future  -     Hemoglobin A1C; Future  -     Lipid Panel; Future  -     Thyroxine, Free; Future  -     Thyroid Stimulating Hormone; Future  Benign essential hypertension       Continue the exercise regimen you are on include more variety some other types of exercises recommended such as running  Low sugar low-fat diet    Screening blood work due April 2025    Thank you for making appointment today Grant    Please follow-up 6 months    Hu Albarran DO, KILEY Albarran DO  "

## 2024-11-07 DIAGNOSIS — I10 HYPERTENSION, UNSPECIFIED TYPE: ICD-10-CM

## 2024-11-07 DIAGNOSIS — E78.5 HYPERLIPIDEMIA, UNSPECIFIED HYPERLIPIDEMIA TYPE: ICD-10-CM

## 2024-11-08 DIAGNOSIS — I10 HYPERTENSION, UNSPECIFIED TYPE: ICD-10-CM

## 2024-11-08 DIAGNOSIS — E78.5 HYPERLIPIDEMIA, UNSPECIFIED HYPERLIPIDEMIA TYPE: ICD-10-CM

## 2024-11-08 RX ORDER — AMLODIPINE BESYLATE 5 MG/1
TABLET ORAL
Refills: 0 | OUTPATIENT
Start: 2024-11-08

## 2024-11-08 RX ORDER — METOPROLOL SUCCINATE 25 MG/1
TABLET, EXTENDED RELEASE ORAL
Refills: 0 | OUTPATIENT
Start: 2024-11-08

## 2024-11-08 RX ORDER — ATORVASTATIN CALCIUM 10 MG/1
TABLET, FILM COATED ORAL
Refills: 0 | OUTPATIENT
Start: 2024-11-08

## 2024-11-08 RX ORDER — AMLODIPINE BESYLATE 5 MG/1
TABLET ORAL
Qty: 90 TABLET | Refills: 1 | Status: SHIPPED | OUTPATIENT
Start: 2024-11-08

## 2024-11-08 RX ORDER — METOPROLOL SUCCINATE 25 MG/1
TABLET, EXTENDED RELEASE ORAL
Qty: 90 TABLET | Refills: 1 | Status: SHIPPED | OUTPATIENT
Start: 2024-11-08

## 2024-11-08 RX ORDER — ATORVASTATIN CALCIUM 10 MG/1
10 TABLET, FILM COATED ORAL DAILY
Qty: 90 TABLET | Refills: 1 | Status: SHIPPED | OUTPATIENT
Start: 2024-11-08

## 2024-11-14 ENCOUNTER — LAB (OUTPATIENT)
Dept: LAB | Facility: LAB | Age: 49
End: 2024-11-14
Payer: COMMERCIAL

## 2024-11-14 DIAGNOSIS — Z00.00 HEALTHCARE MAINTENANCE: ICD-10-CM

## 2024-11-14 LAB
ALBUMIN SERPL BCP-MCNC: 4.3 G/DL (ref 3.4–5)
ALP SERPL-CCNC: 67 U/L (ref 33–120)
ALT SERPL W P-5'-P-CCNC: 37 U/L (ref 10–52)
ANION GAP SERPL CALC-SCNC: 14 MMOL/L (ref 10–20)
AST SERPL W P-5'-P-CCNC: 26 U/L (ref 9–39)
BASOPHILS # BLD AUTO: 0.04 X10*3/UL (ref 0–0.1)
BASOPHILS NFR BLD AUTO: 0.5 %
BILIRUB SERPL-MCNC: 0.7 MG/DL (ref 0–1.2)
BUN SERPL-MCNC: 19 MG/DL (ref 6–23)
CALCIUM SERPL-MCNC: 9.7 MG/DL (ref 8.6–10.6)
CHLORIDE SERPL-SCNC: 103 MMOL/L (ref 98–107)
CHOLEST SERPL-MCNC: 145 MG/DL (ref 0–199)
CHOLESTEROL/HDL RATIO: 3.9
CO2 SERPL-SCNC: 29 MMOL/L (ref 21–32)
CREAT SERPL-MCNC: 1.15 MG/DL (ref 0.5–1.3)
EGFRCR SERPLBLD CKD-EPI 2021: 79 ML/MIN/1.73M*2
EOSINOPHIL # BLD AUTO: 0.32 X10*3/UL (ref 0–0.7)
EOSINOPHIL NFR BLD AUTO: 4.4 %
ERYTHROCYTE [DISTWIDTH] IN BLOOD BY AUTOMATED COUNT: 13.2 % (ref 11.5–14.5)
EST. AVERAGE GLUCOSE BLD GHB EST-MCNC: 120 MG/DL
GLUCOSE SERPL-MCNC: 98 MG/DL (ref 74–99)
HBA1C MFR BLD: 5.8 %
HCT VFR BLD AUTO: 46.1 % (ref 41–52)
HDLC SERPL-MCNC: 37 MG/DL
HGB BLD-MCNC: 15.2 G/DL (ref 13.5–17.5)
IMM GRANULOCYTES # BLD AUTO: 0.04 X10*3/UL (ref 0–0.7)
IMM GRANULOCYTES NFR BLD AUTO: 0.5 % (ref 0–0.9)
LDLC SERPL CALC-MCNC: 82 MG/DL
LYMPHOCYTES # BLD AUTO: 2.39 X10*3/UL (ref 1.2–4.8)
LYMPHOCYTES NFR BLD AUTO: 32.6 %
MCH RBC QN AUTO: 28.4 PG (ref 26–34)
MCHC RBC AUTO-ENTMCNC: 33 G/DL (ref 32–36)
MCV RBC AUTO: 86 FL (ref 80–100)
MONOCYTES # BLD AUTO: 0.76 X10*3/UL (ref 0.1–1)
MONOCYTES NFR BLD AUTO: 10.4 %
NEUTROPHILS # BLD AUTO: 3.78 X10*3/UL (ref 1.2–7.7)
NEUTROPHILS NFR BLD AUTO: 51.6 %
NON HDL CHOLESTEROL: 108 MG/DL (ref 0–149)
NRBC BLD-RTO: 0 /100 WBCS (ref 0–0)
PLATELET # BLD AUTO: 256 X10*3/UL (ref 150–450)
POTASSIUM SERPL-SCNC: 4.8 MMOL/L (ref 3.5–5.3)
PROT SERPL-MCNC: 6.7 G/DL (ref 6.4–8.2)
RBC # BLD AUTO: 5.35 X10*6/UL (ref 4.5–5.9)
SODIUM SERPL-SCNC: 141 MMOL/L (ref 136–145)
T4 FREE SERPL-MCNC: 1.33 NG/DL (ref 0.78–1.48)
TRIGL SERPL-MCNC: 132 MG/DL (ref 0–149)
TSH SERPL-ACNC: 0.99 MIU/L (ref 0.44–3.98)
VLDL: 26 MG/DL (ref 0–40)
WBC # BLD AUTO: 7.3 X10*3/UL (ref 4.4–11.3)

## 2024-11-14 PROCEDURE — 36415 COLL VENOUS BLD VENIPUNCTURE: CPT

## 2024-11-14 PROCEDURE — 85025 COMPLETE CBC W/AUTO DIFF WBC: CPT

## 2024-11-14 PROCEDURE — 83036 HEMOGLOBIN GLYCOSYLATED A1C: CPT

## 2024-11-14 PROCEDURE — 84439 ASSAY OF FREE THYROXINE: CPT

## 2024-11-14 PROCEDURE — 84443 ASSAY THYROID STIM HORMONE: CPT

## 2024-11-14 PROCEDURE — 80061 LIPID PANEL: CPT

## 2024-11-14 PROCEDURE — 80053 COMPREHEN METABOLIC PANEL: CPT

## 2025-04-24 DIAGNOSIS — I10 HYPERTENSION, UNSPECIFIED TYPE: ICD-10-CM

## 2025-04-24 RX ORDER — METOPROLOL SUCCINATE 25 MG/1
25 TABLET, EXTENDED RELEASE ORAL DAILY
Qty: 90 TABLET | Refills: 0 | Status: SHIPPED | OUTPATIENT
Start: 2025-04-24

## 2025-05-06 ENCOUNTER — APPOINTMENT (OUTPATIENT)
Dept: PRIMARY CARE | Facility: CLINIC | Age: 50
End: 2025-05-06
Payer: COMMERCIAL

## 2025-05-06 VITALS
HEART RATE: 75 BPM | OXYGEN SATURATION: 96 % | WEIGHT: 208 LBS | DIASTOLIC BLOOD PRESSURE: 84 MMHG | BODY MASS INDEX: 32.65 KG/M2 | SYSTOLIC BLOOD PRESSURE: 126 MMHG | HEIGHT: 67 IN

## 2025-05-06 DIAGNOSIS — Z00.00 HEALTHCARE MAINTENANCE: Primary | ICD-10-CM

## 2025-05-06 DIAGNOSIS — I10 PRIMARY HYPERTENSION: ICD-10-CM

## 2025-05-06 DIAGNOSIS — R39.198 DECREASED URINE STREAM: ICD-10-CM

## 2025-05-06 DIAGNOSIS — Z12.11 SCREEN FOR COLON CANCER: ICD-10-CM

## 2025-05-06 DIAGNOSIS — I10 BENIGN ESSENTIAL HYPERTENSION: ICD-10-CM

## 2025-05-06 PROCEDURE — 1036F TOBACCO NON-USER: CPT | Performed by: INTERNAL MEDICINE

## 2025-05-06 PROCEDURE — 99214 OFFICE O/P EST MOD 30 MIN: CPT | Performed by: INTERNAL MEDICINE

## 2025-05-06 PROCEDURE — 3074F SYST BP LT 130 MM HG: CPT | Performed by: INTERNAL MEDICINE

## 2025-05-06 PROCEDURE — 3008F BODY MASS INDEX DOCD: CPT | Performed by: INTERNAL MEDICINE

## 2025-05-06 PROCEDURE — 3079F DIAST BP 80-89 MM HG: CPT | Performed by: INTERNAL MEDICINE

## 2025-05-06 NOTE — PROGRESS NOTES
Subjective   Patient ID: Grant Crews is a 49 y.o. male who presents for Follow-up.  HPI  man with past medical history of essential hypertension and GRACE, covid aug 22  He presented to the ER 10/04/2022 for lightheadedness that started while he was sitting at his desk at work. He was evaluated and results showed negative troponin x2, unremarkable CXR/EKG, and urinalysis and CBC/CMP WNL. He was discharged after symptomatically improving with instructions to hydrate, follow up with PCP, and with a prescription for pantoprazole for acid reflux.     Patient continues to do well no issues here for medication refills  He does note however over the last 6 months or so decreased urinary stream nothing makes better or worse  Denies abdominal pain nausea vomiting fever chills dysuria hematuria change in frequency        Home blood pressure ranges systolic 112-114/83      Health Maintenance:      Colonoscopy:      Mammogram:      Pelvic/Pap:      Low dose chest CT:      Aorta duplex:      Optho:      Podiatry:        Vaccines:      Refer to Epic Vaccination Log        ROS:      General: denies fever/chills/weight loss      Head: denies HA/trauma/masses/dizziness      Eyes: denies vision change/loss of vision/blurry vision/diplopia/eye pain      Ears: denies hearing loss/tinnitus/otalgia/otorrhea      Nose: denies nasal drainage/anosmia      Throat: denies dysphagia/odynophagia      Lymphatics: denies lymph node swelling      Breast: denies masses/discharge/dimpling/skin changes      Cardiac: denies CP/palpitations/orthopnea/PND      Pulmonary: denies dyspnea/cough/wheezing      GI: denies abd pain/n/v/diarrhea/melena/hematochezia/hematemesis      : Decreased urinary stream denies dysuria/hematuria/change frequency      Genital: denies genital discharge/lesions      Skin: denies rashes/lesions/masses      MSK: denies weakness/swelling/edema/gait imbalance/pain      Neuro: denies paresthesias/seizures/dysarthria      Psych: Some  "occasional work stress works at the [employer] denies depression/anxiety/suicidal or homicidal ideations            Objective   /84   Pulse 75   Ht 1.702 m (5' 7\")   Wt 94.3 kg (208 lb)   SpO2 96%   BMI 32.58 kg/m²      Physical Exam:     General: AO3, NAD     Head: atraumatic/NC     Eyes: EOMI/PERRLA. Negative APD     Ears: TM pearly gray, EAC clear. No lesions or erythema     Nose: symmetric nares, no discharge     Throat: trachea midline, uvula midline pink mucosa. No thyromegaly     Lymphatics: no cervical/supraclavicular/ant or posterior cervical adenopathy/axillary/inguinal adenopathy     Breast: not examined     Chest: no deformity or tenderness to palpation     Pulm: CTA b/l, no wheeze/rhonchi/rales. nonlabored     Cardiac: RRR +s1s2, no m/r/g.      GI: soft, NT/ND. Normoactive Bsx4. No rebound/guarding.     Rectal: not examined     Genital: not examined     MSK: 5/5 strength UE LE. No edema/clubbing/cyanosis     Skin: no rashes/lesions     Vascular: 2+ palp DP PT radials b/l. Negative carotid bruit     Neuro: CNII-XII intact. No focal deficits. Reflexes 2/4 brachioradialis bicep tricep patellar achilles. Finger to nose intact.     Psych: appropriate mood/affect                    No results found for: \"BMPR1A\", \"CBCDIF\"    Patient states he defers vaccines he actually felt like he was sick more often when he got the flu vaccine so he is deferring vaccine at this time--he is open to the other vaccines though such as shingles and pneumonia  Assessment/Plan   Diagnoses and all orders for this visit:  Healthcare maintenance  -     CBC and Auto Differential; Future  -     Comprehensive Metabolic Panel; Future  -     Hemoglobin A1C; Future  -     Lipid Panel; Future  -     Thyroxine, Free; Future  -     Thyroid Stimulating Hormone; Future  -     Prostate Specific Antigen, Screen; Future  Screen for colon cancer  -     Colonoscopy Screening; Average Risk Patient; Future  Primary hypertension  -     CBC and " Auto Differential; Future  -     Comprehensive Metabolic Panel; Future  -     Hemoglobin A1C; Future  -     Lipid Panel; Future  -     Thyroxine, Free; Future  -     Thyroid Stimulating Hormone; Future  -     Prostate Specific Antigen, Screen; Future  Decreased urine stream  Comments:  Possible BPH versus other  Orders:  -     Prostate Specific Antigen, Screen; Future  Benign essential hypertension       Continue the exercise regimen you are on include more variety some other types of exercises recommended such as running  Low sugar low-fat diet    As we discussed recommend shingles vaccine age 50 as well as Prevnar 20 for pneumonia vaccination    Screening blood work due November 2025    Thank you for making appointment today Grant    Please stop at the  to schedule follow-up 6 months as we discussed    Hu Albarran DO, KILEY Albarran DO

## 2025-05-12 DIAGNOSIS — I10 HYPERTENSION, UNSPECIFIED TYPE: ICD-10-CM

## 2025-05-12 RX ORDER — AMLODIPINE BESYLATE 5 MG/1
TABLET ORAL
Qty: 90 TABLET | Refills: 1 | Status: SHIPPED | OUTPATIENT
Start: 2025-05-12

## 2025-06-18 DIAGNOSIS — Z13.6 SCREENING FOR CARDIOVASCULAR CONDITION: ICD-10-CM

## 2025-06-18 DIAGNOSIS — R93.1 ABNORMAL SCREENING CARDIAC CT: Primary | ICD-10-CM

## 2025-07-11 LAB
ALBUMIN SERPL-MCNC: 4.3 G/DL (ref 3.6–5.1)
ALP SERPL-CCNC: 55 U/L (ref 36–130)
ALT SERPL-CCNC: 29 U/L (ref 9–46)
ANION GAP SERPL CALCULATED.4IONS-SCNC: 8 MMOL/L (CALC) (ref 7–17)
AST SERPL-CCNC: 22 U/L (ref 10–40)
BASOPHILS # BLD AUTO: 50 CELLS/UL (ref 0–200)
BASOPHILS NFR BLD AUTO: 0.9 %
BILIRUB SERPL-MCNC: 0.6 MG/DL (ref 0.2–1.2)
BUN SERPL-MCNC: 18 MG/DL (ref 7–25)
CALCIUM SERPL-MCNC: 9.2 MG/DL (ref 8.6–10.3)
CHLORIDE SERPL-SCNC: 105 MMOL/L (ref 98–110)
CHOLEST SERPL-MCNC: 192 MG/DL
CHOLEST/HDLC SERPL: 4.8 (CALC)
CO2 SERPL-SCNC: 26 MMOL/L (ref 20–32)
CREAT SERPL-MCNC: 1.26 MG/DL (ref 0.6–1.29)
EGFRCR SERPLBLD CKD-EPI 2021: 70 ML/MIN/1.73M2
EOSINOPHIL # BLD AUTO: 160 CELLS/UL (ref 15–500)
EOSINOPHIL NFR BLD AUTO: 2.9 %
ERYTHROCYTE [DISTWIDTH] IN BLOOD BY AUTOMATED COUNT: 13.1 % (ref 11–15)
EST. AVERAGE GLUCOSE BLD GHB EST-MCNC: 120 MG/DL
EST. AVERAGE GLUCOSE BLD GHB EST-SCNC: 6.6 MMOL/L
GLUCOSE SERPL-MCNC: 97 MG/DL (ref 65–99)
HBA1C MFR BLD: 5.8 %
HCT VFR BLD AUTO: 45.5 % (ref 38.5–50)
HDLC SERPL-MCNC: 40 MG/DL
HGB BLD-MCNC: 15.1 G/DL (ref 13.2–17.1)
LDLC SERPL CALC-MCNC: 132 MG/DL (CALC)
LYMPHOCYTES # BLD AUTO: 2178 CELLS/UL (ref 850–3900)
LYMPHOCYTES NFR BLD AUTO: 39.6 %
MCH RBC QN AUTO: 29.2 PG (ref 27–33)
MCHC RBC AUTO-ENTMCNC: 33.2 G/DL (ref 32–36)
MCV RBC AUTO: 87.8 FL (ref 80–100)
MONOCYTES # BLD AUTO: 600 CELLS/UL (ref 200–950)
MONOCYTES NFR BLD AUTO: 10.9 %
NEUTROPHILS # BLD AUTO: 2514 CELLS/UL (ref 1500–7800)
NEUTROPHILS NFR BLD AUTO: 45.7 %
NONHDLC SERPL-MCNC: 152 MG/DL (CALC)
PLATELET # BLD AUTO: 242 THOUSAND/UL (ref 140–400)
PMV BLD REES-ECKER: 10.2 FL (ref 7.5–12.5)
POTASSIUM SERPL-SCNC: 4.8 MMOL/L (ref 3.5–5.3)
PROT SERPL-MCNC: 6.7 G/DL (ref 6.1–8.1)
PSA SERPL-MCNC: 1.44 NG/ML
RBC # BLD AUTO: 5.18 MILLION/UL (ref 4.2–5.8)
SODIUM SERPL-SCNC: 139 MMOL/L (ref 135–146)
T4 FREE SERPL-MCNC: 1.5 NG/DL (ref 0.8–1.8)
TRIGL SERPL-MCNC: 101 MG/DL
TSH SERPL-ACNC: 1.31 MIU/L (ref 0.4–4.5)
WBC # BLD AUTO: 5.5 THOUSAND/UL (ref 3.8–10.8)

## 2025-07-16 DIAGNOSIS — Z12.11 SCREENING FOR COLON CANCER: Primary | ICD-10-CM

## 2025-07-16 RX ORDER — POLYETHYLENE GLYCOL 3350, SODIUM SULFATE ANHYDROUS, SODIUM BICARBONATE, SODIUM CHLORIDE, POTASSIUM CHLORIDE 236; 22.74; 6.74; 5.86; 2.97 G/4L; G/4L; G/4L; G/4L; G/4L
4 POWDER, FOR SOLUTION ORAL ONCE
Qty: 4000 ML | Refills: 0 | Status: SHIPPED | OUTPATIENT
Start: 2025-07-16 | End: 2025-07-16

## 2025-07-23 DIAGNOSIS — I10 HYPERTENSION, UNSPECIFIED TYPE: ICD-10-CM

## 2025-07-23 RX ORDER — METOPROLOL SUCCINATE 25 MG/1
25 TABLET, EXTENDED RELEASE ORAL DAILY
Qty: 90 TABLET | Refills: 0 | Status: SHIPPED | OUTPATIENT
Start: 2025-07-23

## 2025-08-01 ENCOUNTER — APPOINTMENT (OUTPATIENT)
Dept: RADIOLOGY | Facility: CLINIC | Age: 50
End: 2025-08-01
Payer: COMMERCIAL

## 2025-08-01 DIAGNOSIS — Z13.6 SCREENING FOR CARDIOVASCULAR CONDITION: ICD-10-CM

## 2025-08-01 DIAGNOSIS — R93.1 ABNORMAL SCREENING CARDIAC CT: ICD-10-CM

## 2025-08-01 PROCEDURE — 75571 CT HRT W/O DYE W/CA TEST: CPT

## 2025-08-11 ENCOUNTER — TELEPHONE (OUTPATIENT)
Dept: SURGERY | Facility: CLINIC | Age: 50
End: 2025-08-11
Payer: COMMERCIAL

## 2025-08-18 DIAGNOSIS — J84.10 LUNG GRANULOMA (MULTI): Primary | ICD-10-CM

## 2025-09-12 ENCOUNTER — APPOINTMENT (OUTPATIENT)
Dept: GASTROENTEROLOGY | Facility: HOSPITAL | Age: 50
End: 2025-09-12
Payer: COMMERCIAL